# Patient Record
Sex: FEMALE | Race: WHITE | URBAN - METROPOLITAN AREA
[De-identification: names, ages, dates, MRNs, and addresses within clinical notes are randomized per-mention and may not be internally consistent; named-entity substitution may affect disease eponyms.]

---

## 2022-12-08 ENCOUNTER — OFFICE VISIT (OUTPATIENT)
Dept: URBAN - METROPOLITAN AREA CLINIC 45 | Facility: CLINIC | Age: 27
End: 2022-12-08
Payer: COMMERCIAL

## 2022-12-08 DIAGNOSIS — D31.02 BENIGN NEOPLASM OF LEFT CONJUNCTIVA: Primary | ICD-10-CM

## 2022-12-08 ASSESSMENT — INTRAOCULAR PRESSURE
OS: 12
OD: 12

## 2022-12-08 NOTE — IMPRESSION/PLAN
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 Plan: Will continue to observe condition and or symptoms.